# Patient Record
Sex: FEMALE | Race: WHITE | NOT HISPANIC OR LATINO | URBAN - METROPOLITAN AREA
[De-identification: names, ages, dates, MRNs, and addresses within clinical notes are randomized per-mention and may not be internally consistent; named-entity substitution may affect disease eponyms.]

---

## 2022-08-19 ENCOUNTER — OFFICE (OUTPATIENT)
Dept: URBAN - METROPOLITAN AREA CLINIC 19 | Facility: CLINIC | Age: 42
End: 2022-08-19

## 2022-08-19 VITALS
HEART RATE: 76 BPM | HEIGHT: 64 IN | WEIGHT: 136 LBS | SYSTOLIC BLOOD PRESSURE: 111 MMHG | OXYGEN SATURATION: 100 % | DIASTOLIC BLOOD PRESSURE: 77 MMHG

## 2022-08-19 DIAGNOSIS — R10.13 EPIGASTRIC PAIN: ICD-10-CM

## 2022-08-19 DIAGNOSIS — K21.9 GASTRO-ESOPHAGEAL REFLUX DISEASE WITHOUT ESOPHAGITIS: ICD-10-CM

## 2022-08-19 DIAGNOSIS — R09.89 OTHER SPECIFIED SYMPTOMS AND SIGNS INVOLVING THE CIRCULATORY: ICD-10-CM

## 2022-08-19 DIAGNOSIS — R13.10 DYSPHAGIA, UNSPECIFIED: ICD-10-CM

## 2022-08-19 PROCEDURE — 99204 OFFICE O/P NEW MOD 45 MIN: CPT

## 2022-08-19 RX ORDER — PANTOPRAZOLE SODIUM 40 MG/1
40 TABLET, DELAYED RELEASE ORAL
Qty: 30 | Refills: 3 | Status: COMPLETED
Start: 2022-08-19 | End: 2024-04-17

## 2022-08-19 NOTE — SERVICENOTES
The patient's assessment was reviewed with Dr. Collier and a collaborative plan of care was established.

## 2022-08-19 NOTE — SERVICEHPINOTES
41-year-old  white female   Returns for complaints of worsening GERD, epigastric pain, globus sensation and dysphagia.  She recently had Covid-19 in July and ever since has had worsening upper GI symptoms.  She has had GERD in the past, but her symptoms has significantly worsened over the last several weeks.  She has pain and burning when she swallows and has had episodes where food seems to take a while to pass her esophagus.  She has significant heartburn.  She has been taking over-the-counter Prilosec and Pepcid over-the-counter at night.  This is only helped "somewhat".  She denies abdominal pain, change in bowel habit or overt GI bleeding.
[facility]   She was last here for EGD 8/8/16 which found mild gastritis.  Biopsies were negative for H pylori.[facility] Routine lab 7/28/16 was normal. Her brother had RODO and PSC. Family history is negative for colon neoplasm.

## 2024-04-17 ENCOUNTER — OFFICE (OUTPATIENT)
Dept: URBAN - METROPOLITAN AREA CLINIC 19 | Facility: CLINIC | Age: 44
End: 2024-04-17

## 2024-04-17 VITALS
WEIGHT: 144 LBS | DIASTOLIC BLOOD PRESSURE: 85 MMHG | OXYGEN SATURATION: 100 % | SYSTOLIC BLOOD PRESSURE: 132 MMHG | HEIGHT: 64 IN | HEART RATE: 74 BPM

## 2024-04-17 DIAGNOSIS — R19.5 OTHER FECAL ABNORMALITIES: ICD-10-CM

## 2024-04-17 DIAGNOSIS — R14.0 ABDOMINAL DISTENSION (GASEOUS): ICD-10-CM

## 2024-04-17 DIAGNOSIS — K21.9 GASTRO-ESOPHAGEAL REFLUX DISEASE WITHOUT ESOPHAGITIS: ICD-10-CM

## 2024-04-17 PROCEDURE — 99214 OFFICE O/P EST MOD 30 MIN: CPT

## 2024-04-17 NOTE — SERVICENOTES
The patient's assessment was reviewed with Sawyer Collier MD and a collaborative plan of care was established.

## 2024-04-17 NOTE — SERVICEHPINOTES
43-year-old  white female  returns for complaints of thin stools and postprandial bloating with foul smelling gas. She does notice her symptoms seem to vary based on foods that she eats. Dairy products like milk and ice cream seemed to make her symptoms worse. She was previously on Protonix 40 mg/d for a history of GERD.  She reports her symptoms are now well controlled with Gaviscon PRN. She denies dysphagia, nausea, vomiting, abdominal pain or any overt GI bleeding.lily Fontanez last office visit 8/19/22 was for worsening GERD, epigastric pain, globus sensation and dysphagia. An EGD was scheduled for further evaluation, but she canceled the procedure and never rescheduled. 
lily bautista She is followed by Sterling Lawson MD (pulmonolgy) for asthma. EGD 8/8/16 found mild gastritis. Biopsies were negative for H pylori.Her brother had RODO and PSC. Family history is negative for colon neoplasm.